# Patient Record
(demographics unavailable — no encounter records)

---

## 2018-01-01 NOTE — HISTORY AND PHYSICAL REPORT
History of Present Illness


Date of examination: 18


Date of admission: 


18 11:08





Chief complaint: 


[place]


History of present illness: 





Term female delivered to a 20 yo G1.  





[place] Documentation





- Maternal Info


Infant Delivery Method: Spontaneous Vaginal


Prenatal Events: None


Maternal Blood Type: O (+) positive (Infant is O+ with a negative romel)


HbsAg: Negative


HIV: Negative


RPR/VDRL: Non-reactive


Chlamydia: Negative


Gonorrhea: Negative


Group Beta Strep: Positive (adequate intrapartum prophylaxis)


Rubella: Immune


Amniotic Membrane Rupture Date: 18


Amniotic Membrane Rupture Time: 07:34





- Birth


Birth information: 








Delivery Date                    18


Delivery Time                    11:08


1 Minute Apgar                   8


5 Minute Apgar                   9


Gestational Age                  40.1


Birthweight                      3.044 kg


Height                           20 in


[place] Head Circumference       34


 Chest Circumference      32.5


Abdominal Girth                  28











Exam


 Vital Signs











Temp Pulse Resp


 


 99.1 F   132   48 


 


 18 11:51  18 11:51  18 11:51








 











Temp Pulse Resp BP Pulse Ox


 


 98.9 F   152   56       


 


 18 14:35  18 14:35  18 14:35      














- General Appearance


General appearance: Positive: AGA, color consistent with genetic background, 

alert state appropriate (alert and rooting), strong cry, flexed posture





- Constitutional


normal weight





- Skin


Positive: intact, dry/peeling, other lesions (Arabic spots to back)





- HEENT


Head: normocephalic


Fontanel: Positive: soft, flat


Eyes: Positive: clear, symmetrical, EOM normal


Pupils: bilateral: other (INNA RR and PeRRL for eye ointment)





- Nose


Nose: Positive: normal, patent, symmetrical, midline.  Negative: flaring


Nasal septum: Positive: normal position





- Ears


Auricles: normal





- Mouth


Mouth/tongue: symmetry of movement, palate intact, suck/swallow coordinated


Lips: normal


Oral mucosa: other (pink and moist)


Oropharynx: normal





- Throat/Neck


Throat/Neck: normal position, no masses, gag reflex, symmetrical shoulders, 

clavicle intact





- Chest/Lungs


Inspection: symmetric, normal expansion


Auscultation: clear and equal





- Cardiovascular


Femoral pulse/perfusion: equal bilaterally, capillary refill <3 sec., normal


Cardiovascular: regular rate, regular rhythm, S1 (normal), S2 (normal), no 

murmur


Transmission: none


Precordial activity: normal





- Gastrointestinal


Positive: cylindrical, soft, normal BS, 3 vessel cord apparent.  Negative: 

palpable mass, distended, hernia





- Genitourinary


Genitalia: gender clearly delineated


Genitourinary: labia majora covers labia minora, urinary meatus visible, 

vaginal orifice visible


Buttocks/rectum/anus: Positive: symmetrical, anus patent, normal tone.  Negative

: fissure, skin tags





- Musculoskeletal


Spine: Positive: flat and straight when prone


Musculoskeletal: Positive: normal, symmetrical, legs equal length.  Negative: 

extra digits, hip click





- Neurological


Positive: symmetrical movement, strength/tone in all extremities





- Reflexes


Reflexes: reflexes normal





Results





- Laboratory Findings





 Laboratory Tests











  18





  11:00


 


Blood Type  O POSITIVE


 


Direct Antiglob Test  Negative


 


HELENE, IgG Specific  Negative














Assessment and Plan


Assessment: Term  female





Nutrition: Monitor I and O





Heme: Mother is O+; and infant is O+ with a negative Romel;  monitor bilirubin 

per protocol





ID: Negative prenatal serologies; will monitor for s/s of illness; rec'd Hep B 

Vaccine after delivery





Disposition: Routine  care and D/C with mother at 24-48 hours of life.  





- Patient Problems


(1) Single liveborn infant delivered vaginally


Current Visit: Yes   Status: Acute   





Plan





- Provider Discharge Summary





- Follow Up Plan


Follow up with: 


BEBA HERRERA MD [Primary Care Provider] - 7 Days

## 2018-01-01 NOTE — DISCHARGE SUMMARY
Providers





- Providers


Date of Admission: 


05/09/18 11:08





Attending physician: 


BEBA HERRERA MD





Primary care physician: 


To identify prior to discharge





Hospitalization


Condition: Good


Disposition: DC-01 TO HOME OR SELFCARE





Core Measure Documentation





- Palliative Care


Palliative Care/ Comfort Measures: Not Applicable





- Core Measures


Any of the following diagnoses?: none





Exam





- Physical Exam


Narrative exam: 


Well appearing term infant.  PO feeding well, breast.  Voiding and stooling 

adequately. TcB at 24 hours 6.8, TSB 4.9.  TcB at 44 hours within parameters.  





- Constitutional


Vitals: 


 











Temp Pulse Resp BP Pulse Ox


 


 98.7 F   138   42       


 


 05/11/18 00:00  05/11/18 00:00  05/11/18 00:00      











General appearance: Present: no acute distress





- EENT


Eyes: Present: PERRL


ENT: clear oral mucosa





- Neck


Neck: Present: normal ROM





- Respiratory


Respiratory effort: normal


Respiratory: bilateral: CTA





- Cardiovascular


Rhythm: regular





- Extremities


Extremities: pulses intact, pulses symmetrical, No edema, normal temperature, 

normal color, Full ROM


Peripheral Pulses: within normal limits





- Abdominal


General gastrointestinal: Present: soft, non-tender, normal bowel sounds


Female genitourinary: Present: normal





- Rectal


Rectal Exam: normal exam-external/orifice





- Integumentary


Integumentary: Present: warm, dry ([place]ian spots buttocks)





- Musculoskeletal


Musculoskeletal: strength equal bilaterally





- Neurologic


Neurologic: moves all extremities





Plan


Additional Instructions: F/U with ped Monday.  Call today for appointment.

## 2018-01-01 NOTE — PROGRESS NOTE
Assessment and Plan


Nutrition:  Mother is breast feeding.  Monitor weight, I/O.  Support lactation.


ID:  Maternal labs negative except GBS positive, adequately treated prior to 

delivery.  Monitor for s/s of illness.


Heme:  maternal blood type O+, infant O+, Kiko negative.  Monitor per 

jaundice protocol.


Social:  parents updated at bedside.


Discharge:  Parents to identify ped today. 





Subjective


Date of service: 05/10/18


Principal diagnosis: 





Objective





- Exam


Narrative Exam: 


Well appearing term infant.  PO feeding well, breast.  Voiding and stooling 

adequately. 





- Vital Signs


Vital Signs: 


 Vital Signs











  Temp Temp Pulse Resp


 


 05/10/18 08:25  98.3 F   140  42


 


 05/10/18 00:00  98.7 F   144  42


 


 18 20:30  98.6 F   142  42


 


 18 14:35  98.9 F   152  56


 


 18 13:50  98.6 F   160  40


 


 18 13:10  97 F L  97 F L  144  52








 Intake and Output











 05/09/18 05/10/18 05/10/18





 23:59 07:59 15:59


 


Other:   


 


  # Voids   


 


    Diaper 1  


 


  # Bowel Movements 1  














- General Appearance


well appearing, alert, comfortable, no distress





- HENT


HENT: EOM normal, ears normal, nose normal, oropharynx normal


Pupils: bilateral: normal





- Neck


normal position





- Respiratory- Lungs


Inspection: symmetric


Auscultation: clear and equal





- Cardiovascular


Cardiovascular: pulse normal, regular rhythm, no murmur


Precordial activity: normal





- Gastrointestinal


soft, normal BS, 3 vessel cord apparent





- Genitourinary


Genitourinary: normal


Rectum/Anus: normal





- Integumentary


intact, dry/peeling, other (Welsh spots, buttocks)





- Neurological


normal motor function, reflexes normal





- Musculoskeletal


normal